# Patient Record
Sex: MALE | Race: WHITE | URBAN - METROPOLITAN AREA
[De-identification: names, ages, dates, MRNs, and addresses within clinical notes are randomized per-mention and may not be internally consistent; named-entity substitution may affect disease eponyms.]

---

## 2018-02-08 ENCOUNTER — OFFICE VISIT (OUTPATIENT)
Dept: AUDIOLOGY | Facility: CLINIC | Age: 68
End: 2018-02-08
Payer: COMMERCIAL

## 2018-02-08 ENCOUNTER — OFFICE VISIT (OUTPATIENT)
Dept: OTOLARYNGOLOGY | Facility: CLINIC | Age: 68
End: 2018-02-08
Payer: COMMERCIAL

## 2018-02-08 VITALS — HEIGHT: 65 IN | WEIGHT: 220 LBS | BODY MASS INDEX: 36.65 KG/M2 | TEMPERATURE: 98.6 F

## 2018-02-08 DIAGNOSIS — H90.3 SENSORINEURAL HEARING LOSS, BILATERAL: Primary | ICD-10-CM

## 2018-02-08 DIAGNOSIS — H90.3 BILATERAL SENSORINEURAL HEARING LOSS: Primary | ICD-10-CM

## 2018-02-08 PROCEDURE — 99207 ZZC NO CHARGE LOS: CPT | Performed by: AUDIOLOGIST

## 2018-02-08 PROCEDURE — 92550 TYMPANOMETRY & REFLEX THRESH: CPT | Performed by: AUDIOLOGIST

## 2018-02-08 PROCEDURE — 99203 OFFICE O/P NEW LOW 30 MIN: CPT | Performed by: OTOLARYNGOLOGY

## 2018-02-08 PROCEDURE — 92557 COMPREHENSIVE HEARING TEST: CPT | Performed by: AUDIOLOGIST

## 2018-02-08 NOTE — MR AVS SNAPSHOT
After Visit Summary   2/8/2018    Kendra Liang    MRN: [id number]           Patient Information     Date Of Birth          1950        Visit Information        Provider Department      2/8/2018 10:15 AM Carlos Craig MD; [place] [employer] [facility]        Today's Diagnoses     Bilateral sensorineural hearing loss    -  1      Care Instructions    General Scheduling Information  To schedule your CT/MRI scan, please contact [facility] at [phone] OR [facility] at [phone]    To schedule your Surgery, please contact our Specialty Schedulers at [phone]      ENT Clinic Locations Clinic Hours Telephone Number     [facility]  [address] [facility] [address] [facility] [place] [address]     2nd & 4th Thursday:           8:00am - 12:00pm   To schedule/reschedule an appointment with   Dr. Craig,   please contact our   Specialty Scheduling Department at:     [phone]       [facility]  [address] [place] [address] [place] [address]   Monday:             8:00am -- 4:30 pm      1st, 3rd & 5th Thursday:           8:00am - 12:00pm      [address] [address]   Wednesday:       9:00 -- 4:30 pm                    Follow-ups after your visit        Your next 10 appointments already scheduled     Feb 28, 2018  9:00 AM CST   Hearing Aid Consult with Sánchez Campos   [facility] ([facility])    [address] [place] [address] [id number] [phone]              Who to contact     If you have questions or need follow up information about today's clinic visit or your schedule please contact [facility] directly at [phone].  Normal or non-critical lab and imaging results will be communicated to you by MyChart, letter or phone within 4 business days after the clinic has received the results. If you do not hear from us within 7 days, please contact the clinic through [employer]  "or phone. If you have a critical or abnormal lab result, we will notify you by phone as soon as possible.  Submit refill requests through [employer] or call your pharmacy and they will forward the refill request to us. Please allow 3 business days for your refill to be completed.          Additional Information About Your Visit        [facility] Information     [employer] lets you send messages to your doctor, view your test results, renew your prescriptions, schedule appointments and more. To sign up, go to [email or web address] . Click on \"Log in\" on the left side of the screen, which will take you to the Welcome page. Then click on \"Sign up Now\" on the right side of the page.     You will be asked to enter the access code listed below, as well as some personal information. Please follow the directions to create your username and password.     Your access code is: [email or web address]  Expires: 2018 11:23 AM     Your access code will  in 90 days. If you need help or a new code, please call your [facility] clinic or [phone].        Care EveryWhere ID     This is your Care EveryWhere ID. This could be used by other organizations to access your [facility] medical records  [id number]        Your Vitals Were     Temperature Height BMI (Body Mass Index)             98.6  F (37  C) (Tympanic) 1.651 m (5' 5\") 36.61 kg/m2          Blood Pressure from Last 3 Encounters:   No data found for BP    Weight from Last 3 Encounters:   18 99.8 kg (220 lb)              Today, you had the following     No orders found for display       Primary Care Provider Office Phone # Fax #    Mayra Che [phone]       [facility] [address] [place] [id number]        Equal Access to Services     [facility] : Hadii risa maldonadoo Hardik, waaxda luqadaha, qaybta kaalmada yani, luis eduardo zaragoza. So [facility] [place] [phone].    ATENCIÓN: Si rhett espartie esquivel a handy " disposición servicios gratuitos de asistencia lingüística. Cj siddiqui [phone].    We comply with applicable federal civil rights laws and [place] laws. We do not discriminate on the basis of race, color, national origin, age, disability, sex, sexual orientation, or gender identity.            Thank you!     Thank you for choosing [facility]  for your care. Our goal is always to provide you with excellent care. Hearing back from our patients is one way we can continue to improve our services. Please take a few minutes to complete the written survey that you may receive in the mail after your visit with us. Thank you!             Your Updated Medication List - Protect others around you: Learn how to safely use, store and throw away your medicines at www.disposemymeds.org.      Notice  As of 2/8/2018  5:22 PM    You have not been prescribed any medications.

## 2018-02-08 NOTE — LETTER
"    2/8/2018         RE: Kendra Liang  [address]  [facility] [address]        Dear Colleague,    Thank you for referring your patient, Kendra Liang, to the [facility]. Please see a copy of my visit note below.    ENT Consultation      History of Present Illness - Kendra Liang is a 67 year old male with HL. Has had gradual hearing loss. Denies tinnitus, vertigo, and FMHx or PMHx for frequent ear infections. Was exposed to loud noises regularly in the past.     This is a Beninese speaking patient.     Past Medical History - No past medical history on file.    Current Medications - No current outpatient prescriptions on file.    Allergies - Not on File    Social History -   Social History     Social History     Marital status:      Spouse name: N/A     Number of children: N/A     Years of education: N/A     Social History Main Topics     Smoking status: Never Smoker     Smokeless tobacco: Never Used     Alcohol use None     Drug use: None     Sexual activity: Not Asked     Other Topics Concern     None     Social History Narrative     None       Family History - No family history on file.    Review of Systems - As per HPI and PMHx, otherwise review of system review of the head and neck negative.    This document serves as a record of the services and decisions personally performed and made by Carlos Craig MD. It was created on his behalf by Carlos Rios, a trained medical scribe. The creation of this document is based the provider's statements to the medical scribe.  Carlos Rios February 8, 2018 10:55 AM     Physical Exam  Temp 98.6  F (37  C) (Tympanic)  Ht 1.651 m (5' 5\")  Wt 99.8 kg (220 lb)  BMI 36.61 kg/m2  BMI: Body mass index is 36.61 kg/(m^2).    General - The patient is well nourished and well developed, and appears to have good nutritional status.  Alert and oriented to person and place, answers questions and cooperates with examination appropriately.    SKIN - " No suspicious lesions or rashes.  Respiration - No respiratory distress.     Head and Face - Normocephalic and atraumatic, with no gross asymmetry noted of the contour of the facial features.  The facial nerve is intact, with strong symmetric movements.    Voice and Breathing - The patient was breathing comfortably without the use of accessory muscles. There was no wheezing, stridor, or stertor.  The patients voice was clear and strong, and had appropriate pitch and quality.    Ears - Bilateral pinna and EACs with normal appearing overlying skin. Tympanic membrane intact with good mobility on pneumatic otoscopy bilaterally. Bony landmarks of the ossicular chain are normal. The tympanic membranes are normal in appearance. No retraction, perforation, or masses.  No fluid or purulence was seen in the external canal or the middle ear.      Eyes - Extraocular movements intact.  Sclera were not icteric or injected, conjunctiva were pink and moist.    Mouth - Examination of the oral cavity showed pink, healthy oral mucosa. No lesions or ulcerations noted.  The tongue was mobile and midline, and the dentition were in good condition.      Throat - The walls of the oropharynx were smooth, pink, moist, symmetric, and had no lesions or ulcerations.  The tonsillar pillars and soft palate were symmetric.  The uvula was midline on elevation.    Neck - Normal midline excursion of the laryngotracheal complex during swallowing.  Full range of motion on passive movement.  Palpation of the occipital, submental, submandibular, internal jugular chain, and supraclavicular nodes did not demonstrate any abnormal lymph nodes or masses.  The carotid pulse was palpable bilaterally.  Palpation of the thyroid was soft and smooth, with no nodules or goiter appreciated.  The trachea was mobile and midline.    Nose - External contour is symmetric, no gross deflection or scars.  Nasal mucosa is pink and moist with no abnormal mucus.  The septum was  midline and non-obstructive, turbinates of normal size and position.  No polyps, masses, or purulence noted on examination.    Neuro - Nonfocal neuro exam is normal, CN 2 through 12 intact, normal gait and muscle tone.    Performed in clinic today:  Audiologic Studies - An audiogram and tympanogram were performed today as part of the evaluation and personally reviewed. The tympanogram shows Type A curves on the right and Type A curves on the left, with normal canal volumes and middle ear pressures.  The audiogram showed mild to moderate  mid to high frequency sensorineural HL on the right and mild to moderate mid to high frequency sensorineural HL on the left.        A/P - Kendra Liang is a 67 year old male with sensorineural HL in the bilateral ear.  We discussed use of hearing aids.   Recheck hearing in 1 year.    Discussed use of hearing aides. Referral to audiology placed      Carlos Craig MD    The information in this document, created by the medical scribe for me, accurately reflects the services I personally performed and the decisions made by me. I have reviewed and approved this document for accuracy prior to leaving the patient care area.  Carlos Craig MD  10:55 AM, 02/08/18      Again, thank you for allowing me to participate in the care of your patient.        Sincerely,        Carlos Craig MD, MD

## 2018-02-08 NOTE — PROGRESS NOTES
"ENT Consultation      History of Present Illness - Kendra Liang is a 67 year old male with HL. Has had gradual hearing loss. Denies tinnitus, vertigo, and FMHx or PMHx for frequent ear infections. Was exposed to loud noises regularly in the past.     This is a Taiwanese speaking patient.     Past Medical History - No past medical history on file.    Current Medications - No current outpatient prescriptions on file.    Allergies - Not on File    Social History -   Social History     Social History     Marital status:      Spouse name: N/A     Number of children: N/A     Years of education: N/A     Social History Main Topics     Smoking status: Never Smoker     Smokeless tobacco: Never Used     Alcohol use None     Drug use: None     Sexual activity: Not Asked     Other Topics Concern     None     Social History Narrative     None       Family History - No family history on file.    Review of Systems - As per HPI and PMHx, otherwise review of system review of the head and neck negative.    This document serves as a record of the services and decisions personally performed and made by Carlos Craig MD. It was created on his behalf by Carlos Rios, a trained medical scribe. The creation of this document is based the provider's statements to the medical scribe.  Carlos Rios February 8, 2018 10:55 AM     Physical Exam  Temp 98.6  F (37  C) (Tympanic)  Ht 1.651 m (5' 5\")  Wt 99.8 kg (220 lb)  BMI 36.61 kg/m2  BMI: Body mass index is 36.61 kg/(m^2).    General - The patient is well nourished and well developed, and appears to have good nutritional status.  Alert and oriented to person and place, answers questions and cooperates with examination appropriately.    SKIN - No suspicious lesions or rashes.  Respiration - No respiratory distress.     Head and Face - Normocephalic and atraumatic, with no gross asymmetry noted of the contour of the facial features.  The facial nerve is intact, with strong " symmetric movements.    Voice and Breathing - The patient was breathing comfortably without the use of accessory muscles. There was no wheezing, stridor, or stertor.  The patients voice was clear and strong, and had appropriate pitch and quality.    Ears - Bilateral pinna and EACs with normal appearing overlying skin. Tympanic membrane intact with good mobility on pneumatic otoscopy bilaterally. Bony landmarks of the ossicular chain are normal. The tympanic membranes are normal in appearance. No retraction, perforation, or masses.  No fluid or purulence was seen in the external canal or the middle ear.      Eyes - Extraocular movements intact.  Sclera were not icteric or injected, conjunctiva were pink and moist.    Mouth - Examination of the oral cavity showed pink, healthy oral mucosa. No lesions or ulcerations noted.  The tongue was mobile and midline, and the dentition were in good condition.      Throat - The walls of the oropharynx were smooth, pink, moist, symmetric, and had no lesions or ulcerations.  The tonsillar pillars and soft palate were symmetric.  The uvula was midline on elevation.    Neck - Normal midline excursion of the laryngotracheal complex during swallowing.  Full range of motion on passive movement.  Palpation of the occipital, submental, submandibular, internal jugular chain, and supraclavicular nodes did not demonstrate any abnormal lymph nodes or masses.  The carotid pulse was palpable bilaterally.  Palpation of the thyroid was soft and smooth, with no nodules or goiter appreciated.  The trachea was mobile and midline.    Nose - External contour is symmetric, no gross deflection or scars.  Nasal mucosa is pink and moist with no abnormal mucus.  The septum was midline and non-obstructive, turbinates of normal size and position.  No polyps, masses, or purulence noted on examination.    Neuro - Nonfocal neuro exam is normal, CN 2 through 12 intact, normal gait and muscle tone.    Performed  in clinic today:  Audiologic Studies - An audiogram and tympanogram were performed today as part of the evaluation and personally reviewed. The tympanogram shows Type A curves on the right and Type A curves on the left, with normal canal volumes and middle ear pressures.  The audiogram showed mild to moderate  mid to high frequency sensorineural HL on the right and mild to moderate mid to high frequency sensorineural HL on the left.        A/P - Kendra Liang is a 67 year old male with sensorineural HL in the bilateral ear.  We discussed use of hearing aids.   Recheck hearing in 1 year.    Discussed use of hearing aides. Referral to audiology placed      Carlos Craig MD    The information in this document, created by the medical scribe for me, accurately reflects the services I personally performed and the decisions made by me. I have reviewed and approved this document for accuracy prior to leaving the patient care area.  Carlos Craig MD  10:55 AM, 02/08/18

## 2018-02-08 NOTE — PATIENT INSTRUCTIONS
General Scheduling Information  To schedule your CT/MRI scan, please contact [facility] at [phone] OR [facility] at [phone]    To schedule your Surgery, please contact our Specialty Schedulers at [phone]      ENT Clinic Locations Clinic Hours Telephone Number     [facility] [place]  [address] [facility]  [place] [address]     2nd & 4th Thursday:           8:00am - 12:00pm   To schedule/reschedule an appointment with   Dr. Craig,   please contact our   Specialty Scheduling Department at:     [phone]       [facility]  [address] [place] [address] [place] [address]   Monday:             8:00am -- 4:30 pm      1st, 3rd & 5th Thursday:           8:00am - 12:00pm      [facility] [address] [address]   Wednesday:       9:00 -- 4:30 pm

## 2018-02-08 NOTE — MR AVS SNAPSHOT
"              After Visit Summary   2/8/2018    Kendra Liang    MRN: [id number]           Patient Information     Date Of Birth          1950        Visit Information        Provider Department      2/8/2018 9:45 AM Marianne Cotter AuD; [place] [employer] [facility]        Today's Diagnoses     Sensorineural hearing loss, bilateral    -  1       Follow-ups after your visit        Your next 10 appointments already scheduled     Feb 28, 2018  9:00 AM CST   Hearing Aid Consult with Sánchez Campos   [facility]    [address] [place] [id number] [phone]              Who to contact     If you have questions or need follow up information about today's clinic visit or your schedule please contact [facility] directly at [phone].  Normal or non-critical lab and imaging results will be communicated to you by MyChart, letter or phone within 4 business days after the clinic has received the results. If you do not hear from us within 7 days, please contact the clinic through MyChart or phone. If you have a critical or abnormal lab result, we will notify you by phone as soon as possible.  Submit refill requests through [employer] or call your pharmacy and they will forward the refill request to us. Please allow 3 business days for your refill to be completed.          Additional Information About Your Visit        MyChart Information     [employer] lets you send messages to your doctor, view your test results, renew your prescriptions, schedule appointments and more. To sign up, go to [email or web address] . Click on \"Log in\" on the left side of the screen, which will take you to the Welcome page. Then click on \"Sign up Now\" on the right side of the page.     You will be asked to enter the access code listed below, as well as some personal information. Please follow the directions to create your username " and password.     Your access code is: [email or web address]  Expires: 2018 11:23 AM     Your access code will  in 90 days. If you need help or a new code, please call your [facility] clinic or [phone].        Care EveryWhere ID     This is your Care EveryWhere ID. This could be used by other organizations to access your [facility] medical records  [id number]         Blood Pressure from Last 3 Encounters:   No data found for BP    Weight from Last 3 Encounters:   18 220 lb (99.8 kg)              We Performed the Following     AUDIOGRAM/TYMPANOGRAM - INTERFACE     COMPREHENSIVE HEARING TEST     TYMPANOMETRY AND REFLEX THRESHOLD MEASUREMENTS        Primary Care Provider Office Phone # Fax #    Mayra Che [phone]       [facility] [address] [facility] [address]        Equal Access to Services     [facility] : Hadii aad ku hadasho Soomaali, waaxda luqadaha, qaybta kaalmada adeegyada, waxay idiin hayaan ademandi kharadav zaragoza . So [facility] [phone].    ATENCIÓN: Si habla español, tiene a handy disposición servicios gratuitos de asistencia lingüística. Llame al [phone].    We comply with applicable federal civil rights laws and [place] laws. We do not discriminate on the basis of race, color, national origin, age, disability, sex, sexual orientation, or gender identity.            Thank you!     Thank you for choosing [facility]  for your care. Our goal is always to provide you with excellent care. Hearing back from our patients is one way we can continue to improve our services. Please take a few minutes to complete the written survey that you may receive in the mail after your visit with us. Thank you!             Your Updated Medication List - Protect others around you: Learn how to safely use, store and throw away your medicines at www.disposemymeds.org.      Notice  As of 2018 11:23 AM    You have not been prescribed any medications.

## 2018-02-08 NOTE — PROGRESS NOTES
AUDIOLOGY REPORT     SUMMARY: Audiology visit completed. See audiogram for results.     RECOMMENDATIONS: Follow-up with ENT    Aishwarya Booth Licensed Audiologist #[id number]

## 2018-02-28 ENCOUNTER — OFFICE VISIT (OUTPATIENT)
Dept: AUDIOLOGY | Facility: CLINIC | Age: 68
End: 2018-02-28
Payer: COMMERCIAL

## 2018-02-28 DIAGNOSIS — H90.3 SENSORINEURAL HEARING LOSS, BILATERAL: Primary | ICD-10-CM

## 2018-02-28 PROCEDURE — 99207 ZZC NO CHARGE LOS: CPT | Performed by: AUDIOLOGIST

## 2018-02-28 PROCEDURE — V5275 EAR IMPRESSION: HCPCS | Mod: RT | Performed by: AUDIOLOGIST

## 2018-02-28 PROCEDURE — 92591 HC HEARING AID EXAM BINAURAL: CPT | Performed by: AUDIOLOGIST

## 2018-02-28 NOTE — PROGRESS NOTES
AUDIOLOGY REPORT    SUBJECTIVE: Kendra Liang is a 67 year old male was seen in the Audiology Clinic at  [facility] on 2/28/18 to discuss concerns with hearing and functional communication difficulties. The patient was accompanied by their . Kendra has been seen previously on 2/8/2018, and results revealed a bilateral high frequency sensorineural hearing loss.  The patient was medically evaluated and determined to be cleared for binaural hearing aids by Dr. Craig. Kendra notes difficulty with communication in a variety of listening situations.    OBJECTIVE:  Patient is a hearing aid candidate. Patient would like to move forward with a hearing aid evaluation today. Therefore, the patient was presented with different options for amplification to help aid in communication. Discussed styles, levels of technology and monaural vs. binaural fitting.     The hearing aid(s) mutually chosen were:  Binaural: Phonak Audeo   COLOR: P6 Silver Grey  BATTERY SIZE: 312  EARMOLD/TIPS: micro molds with canal locks  CANAL/ LENGTH: 2 xS receivers    Otoscopy revealed ears are clear of cerumen bilaterally. Bilateral earmolds were taken without incident.    ASSESSMENT:   Sensorineural hearing loss      Reviewed purchase information and warranty information with patient. The 45 day trial period was explained to patient. The patient was given a copy of the [place] Department of Health consumer brochure on purchasing hearing instruments. Patient risk factors have been provided to the patient in writing prior to the sale of the hearing aid per FDA regulation. The risk factors are also available in the User Instructional Booklet to be presented on the day of the hearing aid fitting. Hearing aid(s) ordered. Hearing aid evaluation completed.    PLAN: Kendra is scheduled to return in 2-3 weeks for a hearing aid fitting and programming. Purchase agreement will be completed on that date. Please contact this  clinic with any questions or concerns.      Jus Mckeon [facility]  Licensed Audiologist #[id number]  2/28/2018

## 2018-02-28 NOTE — MR AVS SNAPSHOT
"              After Visit Summary   2/28/2018    Kendra Liang    MRN: [id number]           Patient Information     Date Of Birth          1950        Visit Information        Provider Department      2/28/2018 8:45 AM Jus Juarez AuD; [employer] [facility]        Today's Diagnoses     Sensorineural hearing loss, bilateral    -  1       Follow-ups after your visit        Your next 10 appointments already scheduled     Mar 23, 2018  9:00 AM CDT   Hearing Aid Fitting with Sánchez Campos   [facility] ([facility]    [address] [place] [address]   [phone]              Who to contact     If you have questions or need follow up information about today's clinic visit or your schedule please contact [facility] directly at [phone].  Normal or non-critical lab and imaging results will be communicated to you by MyChart, letter or phone within 4 business days after the clinic has received the results. If you do not hear from us within 7 days, please contact the clinic through MyChart or phone. If you have a critical or abnormal lab result, we will notify you by phone as soon as possible.  Submit refill requests through [employer] or call your pharmacy and they will forward the refill request to us. Please allow 3 business days for your refill to be completed.          Additional Information About Your Visit        MyChart Information     [employer] lets you send messages to your doctor, view your test results, renew your prescriptions, schedule appointments and more. To sign up, go to [email or web address] . Click on \"Log in\" on the left side of the screen, which will take you to the Welcome page. Then click on \"Sign up Now\" on the right side of the page.     You will be asked to enter the access code listed below, as well as some personal information. Please follow the directions to create your username " and password.     Your access code is: [email or web address]  Expires: 2018 11:23 AM     Your access code will  in 90 days. If you need help or a new code, please call your [place] clinic or [phone].        Care EveryWhere ID     This is your Care EveryWhere ID. This could be used by other organizations to access your [place] medical records  [id number]         Blood Pressure from Last 3 Encounters:   No data found for BP    Weight from Last 3 Encounters:   18 220 lb (99.8 kg)              We Performed the Following     EAR IMPRESSION, EACH     HEARING AID EXAM BINAURAL        Primary Care Provider Office Phone # Fax #    Mayra Che [phone]       [facility] [address] [address]        Equal Access to Services     AKANKSHA BRITTON : Hadii aad ku hadasho Soomaali, waaxda luqadaha, qaybta kaalmada adeegyada, waxay arnoldoin hayaan ademandi zaragoza . So [facility] [phone].    ATENCIÓN: Si habla español, tiene a handy disposición servicios gratuitos de asistencia lingüística. Llame al [phone].    We comply with applicable federal civil rights laws and [place] laws. We do not discriminate on the basis of race, color, national origin, age, disability, sex, sexual orientation, or gender identity.            Thank you!     Thank you for choosing [facility]  for your care. Our goal is always to provide you with excellent care. Hearing back from our patients is one way we can continue to improve our services. Please take a few minutes to complete the written survey that you may receive in the mail after your visit with us. Thank you!             Your Updated Medication List - Protect others around you: Learn how to safely use, store and throw away your medicines at www.disposemymeds.org.      Notice  As of 2018  9:34 AM    You have not been prescribed any medications.

## 2018-03-23 ENCOUNTER — OFFICE VISIT (OUTPATIENT)
Dept: AUDIOLOGY | Facility: CLINIC | Age: 68
End: 2018-03-23
Payer: COMMERCIAL

## 2018-03-23 DIAGNOSIS — H90.3 SENSORINEURAL HEARING LOSS, BILATERAL: Primary | ICD-10-CM

## 2018-03-23 PROCEDURE — V5011 HEARING AID FITTING/CHECKING: HCPCS | Mod: RT | Performed by: AUDIOLOGIST

## 2018-03-23 PROCEDURE — V5020 CONFORMITY EVALUATION: HCPCS | Mod: LT | Performed by: AUDIOLOGIST

## 2018-03-23 PROCEDURE — V5011 HEARING AID FITTING/CHECKING: HCPCS | Mod: LT | Performed by: AUDIOLOGIST

## 2018-03-23 PROCEDURE — 92593 HC HEARING AID CHECK, BINAURAL: CPT | Performed by: AUDIOLOGIST

## 2018-03-23 PROCEDURE — V5261 HEARING AID, DIGIT, BIN, BTE: HCPCS | Mod: NU | Performed by: AUDIOLOGIST

## 2018-03-23 PROCEDURE — V5020 CONFORMITY EVALUATION: HCPCS | Mod: RT | Performed by: AUDIOLOGIST

## 2018-03-23 PROCEDURE — V5160 DISPENSING FEE BINAURAL: HCPCS | Performed by: AUDIOLOGIST

## 2018-03-23 PROCEDURE — V5264 EAR MOLD/INSERT: HCPCS | Performed by: AUDIOLOGIST

## 2018-03-23 NOTE — MR AVS SNAPSHOT
"              After Visit Summary   3/23/2018    Kendra Liang    MRN: [id number]           Patient Information     Date Of Birth          1950        Visit Information        Provider Department      3/23/2018 8:45 AM Jus Juarez AuD; [employer] [facility]        Today's Diagnoses     Sensorineural hearing loss, bilateral    -  1       Follow-ups after your visit        Your next 10 appointments already scheduled     Apr 10, 2018  3:30 PM CDT   Return Visit with Sánchez Campos   [facility]    [address] [place] [address]   [phone]              Who to contact     If you have questions or need follow up information about today's clinic visit or your schedule please contact [facility] directly at [phone].  Normal or non-critical lab and imaging results will be communicated to you by MyChart, letter or phone within 4 business days after the clinic has received the results. If you do not hear from us within 7 days, please contact the clinic through MyChart or phone. If you have a critical or abnormal lab result, we will notify you by phone as soon as possible.  Submit refill requests through [employer] or call your pharmacy and they will forward the refill request to us. Please allow 3 business days for your refill to be completed.          Additional Information About Your Visit        MyChart Information     [employer] lets you send messages to your doctor, view your test results, renew your prescriptions, schedule appointments and more. To sign up, go to [email or web address] . Click on \"Log in\" on the left side of the screen, which will take you to the Welcome page. Then click on \"Sign up Now\" on the right side of the page.     You will be asked to enter the access code listed below, as well as some personal information. Please follow the directions to create your username and " password.     Your access code is: [email or web address]  Expires: 2018 12:23 PM     Your access code will  in 90 days. If you need help or a new code, please call your [place] clinic or [phone].        Care EveryWhere ID     This is your Care EveryWhere ID. This could be used by other organizations to access your [place] medical records  [id number]         Blood Pressure from Last 3 Encounters:   No data found for BP    Weight from Last 3 Encounters:   18 220 lb (99.8 kg)              We Performed the Following     DISPENSING FEE, BINAURAL HEARING AID     EAR MOLD/INSERT, NONDISPOSABLE, ANY TYPE     HEARING AID BTE DIGITAL, BINAURAL     HEARING AID CHECK, BINAURAL     HEARING AID CONFORMITY EVALUATION     HEARING AID FIT/ORIENTATION/CHECK        Primary Care Provider Office Phone # Fax #    Mayra Che [phone]       [facility] [address] [place] [address]        Equal Access to Services     MARIA GUADALUPE [facility] : Hadii aad ku hadasho Soomaali, waaxda luqadaha, qaybta kaalmada adeegyada, waxay idiin hayaan javiereg kharadav ladarian . So [facility] [phone].    ATENCIÓN: Si habla español, tiene a handy disposición servicios gratuitos de asistencia lingüística. [facility] [phone].    We comply with applicable federal civil rights laws and [place] laws. We do not discriminate on the basis of race, color, national origin, age, disability, sex, sexual orientation, or gender identity.            Thank you!     Thank you for choosing [facility]  for your care. Our goal is always to provide you with excellent care. Hearing back from our patients is one way we can continue to improve our services. Please take a few minutes to complete the written survey that you may receive in the mail after your visit with us. Thank you!             Your Updated Medication List - Protect others around you: Learn how to safely use, store and throw away your medicines at  www.disposemymeds.org.      Notice  As of 3/23/2018  9:46 AM    You have not been prescribed any medications.

## 2018-03-23 NOTE — PROGRESS NOTES
AUDIOLOGY REPORT    SUBJECTIVE: Kendra Liang is a 67 year old male who was seen in the Audiology Clinic at the [facility] for a fitting of binaural hearing aids. Previous results have revealed a bilateral sensorineural hearing loss. The patient was given medical clearance to pursue amplification by  Carlos Craig MD. Patient was accompanied today's appointment by his .     OBJECTIVE: Prior to fitting, a hearing aid check was performed to ensure device functionality.The hearing aid conformity evaluation was completed.The hearing aids were placed and they provided a good fit. Real-ear-probe-microphone measurements were completed on the [employer] system and were an acceptable match to NAL-NL2 target with soft sounds audible, moderate sounds comfortable, and loud sounds below discomfort. UCLs are verified through maximum power output measures and demonstrate appropriate limiting of loud inputs. Kendra was oriented to proper hearing aid use, care, cleaning (no water, dry brush), batteries (size 312, insertion/removal, toxicity, low-battery signal), aid insertion/removal, user booklet, warranty information, storage cases, and other hearing aid details. The patient confirmed understanding of hearing aid use and care, and showed proper insertion of hearing aid and batteries while in the office today.Kendra reported good volume and sound quality today.   Hearing aids were programmed as follows:  Program 1: All Around    ASSESSMENT: Binaural hearing aid(s) were fit today. Verification measures were performed.   EAR(S) FIT: Binaural  MA HEARING AID MODEL NAME:  Mckinley Gonsalez [id number]  MA HEARING AID MODEL NUMBER: [id number]  HEARING AID STYLE: RITE  EARMOLDS/TIP/ LINK: Micromolds with canal locks and size 2 xS receivers  SERIAL NUMBERS: Right: [id number]; Left: [id number]  WARRANTY END DATE: 6/5/2021  Kendra signed the Hearing Aid Purchase Agreement and was given a copy, as well as details on his  hearing aids.    PLAN:Kendra will return for follow-up in 2-3 weeks for a hearing aid review appointment. Please call this clinic with questions regarding today s appointment.    CHARGES:    Hearing Aid Check: Binaural, [address]24, $81.00  Dispensing Fee: Binaural, , $500.00  Fit/Orientation: Binaural, , $322.00  Hearing Aid Conformity Evaluation: , Qty:2 $174  Hearing Aid Digital: Binaural, BTE,  $2523  Earmolds: Binaural  $160  Total: $3760 Bill to patient's insurance, balance to patient.     Jus Mckeon [facility]  Licensed Audiologist #[id number]  3/23/2018

## 2018-04-10 ENCOUNTER — OFFICE VISIT (OUTPATIENT)
Dept: AUDIOLOGY | Facility: CLINIC | Age: 68
End: 2018-04-10
Payer: COMMERCIAL

## 2018-04-10 DIAGNOSIS — H90.3 SENSORINEURAL HEARING LOSS, BILATERAL: Primary | ICD-10-CM

## 2018-04-10 PROCEDURE — 99207 ZZC NO CHARGE LOS: CPT | Performed by: AUDIOLOGIST

## 2018-04-10 PROCEDURE — V5266 BATTERY FOR HEARING DEVICE: HCPCS | Performed by: AUDIOLOGIST

## 2018-04-10 PROCEDURE — V5299 HEARING SERVICE: HCPCS | Performed by: AUDIOLOGIST

## 2018-04-10 NOTE — MR AVS SNAPSHOT
"              After Visit Summary   4/10/2018    Kendra Liang    MRN: [id number]           Patient Information     Date Of Birth          1950        Visit Information        Provider Department      4/10/2018 3:15 PM Jus Juarez, AuD; [employer] [facility]        Today's Diagnoses     Sensorineural hearing loss, bilateral    -  1       Follow-ups after your visit        Who to contact     If you have questions or need follow up information about today's clinic visit or your schedule please contact [facility] directly at [phone].  Normal or non-critical lab and imaging results will be communicated to you by [employer], letter or phone within 4 business days after the clinic has received the results. If you do not hear from us within 7 days, please contact the clinic through [employer] or phone. If you have a critical or abnormal lab result, we will notify you by phone as soon as possible.  Submit refill requests through [employer] or call your pharmacy and they will forward the refill request to us. Please allow 3 business days for your refill to be completed.          Additional Information About Your Visit        MyChart Information     [employer] lets you send messages to your doctor, view your test results, renew your prescriptions, schedule appointments and more. To sign up, go to [email or web address] . Click on \"Log in\" on the left side of the screen, which will take you to the Welcome page. Then click on \"Sign up Now\" on the right side of the page.     You will be asked to enter the access code listed below, as well as some personal information. Please follow the directions to create your username and password.     Your access code is: [email or web address]  Expires: 2018 12:23 PM     Your access code will  in 90 days. If you need help or a new code, please call your [facility] or [phone].        Care EveryWhere ID     This is your Care " EveryWhere ID. This could be used by other organizations to access your [place] medical records  [id number]         Blood Pressure from Last 3 Encounters:   No data found for BP    Weight from Last 3 Encounters:   02/08/18 220 lb (99.8 kg)              We Performed the Following     HC HEARING DEVICE BATTERY     HEARING AID CHECK/NO CHARGE        Primary Care Provider Office Phone # Fax #    Mayra Che [phone]       [facility] [address] [place] [address]        Equal Access to Services     [facility]: Hadii aad ku hadasho Soomaali, waaxda luqadaha, qaybta kaalmada adeegyada, waxay idiin hayaan jose zaragoza . So [facility] [phone].    ATENCIÓN: Si habla español, tiene a handy disposición servicios gratuitos de asistencia lingüística. [facility] [phone].    We comply with applicable federal civil rights laws and [place] laws. We do not discriminate on the basis of race, color, national origin, age, disability, sex, sexual orientation, or gender identity.            Thank you!     Thank you for choosing [facility]  for your care. Our goal is always to provide you with excellent care. Hearing back from our patients is one way we can continue to improve our services. Please take a few minutes to complete the written survey that you may receive in the mail after your visit with us. Thank you!             Your Updated Medication List - Protect others around you: Learn how to safely use, store and throw away your medicines at www.disposemymeds.org.      Notice  As of 4/10/2018  3:49 PM    You have not been prescribed any medications.

## 2018-04-10 NOTE — PROGRESS NOTES
AUDIOLOGY REPORT    SUBJECTIVE:Kendra Liang is a 67 year old male who was seen in the Audiology Clinic at the [facility] on 4/10/2018  for a follow-up check regarding the fitting of new hearing aids. Previous results have revealed binaural sensorineural hearing loss.  The patient has been seen previously in this clinic and was fit with Phonak RITEs on 3/23/2018.  Kendra reports good sound quality with the hearing aid(s) and increased wear time with the heaing aids. Patient was accompanied to today's appointment by his .     OBJECTIVE:     Based on patient report, the following changes were made; None.    Reviewed 45 day trial period, care, cleaning (no water, dry brush), batteries (size 312) insertion/removal, toxicity, low-battery signal), aid insertion/removal, volume adjustment (if applicable), user booklet, warranty information, storage cases, and other hearing aid details.     Per guidelines of patient's insurance, 24 units of size 312 batteries were dispensed today. Reviewed that patent is eligable for batteries once every 90 days, and how they can request new batteries.     ASSESSMENT: A follow-up appointment for hearing aid fitting was completed today. Changes to hearing aid was completed as outlined above.     PLAN:Kendra will return for follow-up as needed, or at least every 9-12 months for cleaning and assessment of hearing aid.  . Please call this clinic with any questions regarding today s appointment.    CHARGES:   .002 x24 Batteries $24 bill to patient's insurance.     Jus Mckeon [facility]  Licensed Audiologist #[id number]  4/10/2018

## 2018-07-19 ENCOUNTER — TELEPHONE (OUTPATIENT)
Dept: AUDIOLOGY | Facility: CLINIC | Age: 68
End: 2018-07-19
Payer: COMMERCIAL

## 2018-07-19 DIAGNOSIS — H90.3 SENSORINEURAL HEARING LOSS, BILATERAL: Primary | ICD-10-CM

## 2018-07-19 PROCEDURE — V5266 BATTERY FOR HEARING DEVICE: HCPCS | Performed by: AUDIOLOGIST

## 2018-07-19 NOTE — TELEPHONE ENCOUNTER
Reason for Call:  Other call back    Detailed comments: Patient would like new hearing aid batteries delivered. Home address is confirmed.     Phone Number Patient can be reached at: Cell number on file:    Telephone Information:   Mobile [phone]       Best Time: asap    Can we leave a detailed message on this number? YES    Call taken on 7/19/2018 at 11:08 AM by Umberto Lyon

## 2018-07-19 NOTE — TELEPHONE ENCOUNTER
Patient calls to request his 3 month supply of batteries be mailed to his home address. Mailed 24 size 312 batteries to the address on file.     CHARGES:   .002 x24 Batteries ($1). Total: $24. Bill to patient's insurance.    Aishwarya Pierce, CCC-A  Licensed Audiologist #[id number]  7/19/2018

## 2018-11-13 ENCOUNTER — ALLIED HEALTH/NURSE VISIT (OUTPATIENT)
Dept: AUDIOLOGY | Facility: CLINIC | Age: 68
End: 2018-11-13
Payer: COMMERCIAL

## 2018-11-13 DIAGNOSIS — H90.3 SENSORINEURAL HEARING LOSS, BILATERAL: Primary | ICD-10-CM

## 2018-11-13 PROCEDURE — V5266 BATTERY FOR HEARING DEVICE: HCPCS | Performed by: AUDIOLOGIST

## 2018-11-13 PROCEDURE — 99207 ZZC NO CHARGE LOS: CPT | Performed by: AUDIOLOGIST

## 2018-11-13 NOTE — MR AVS SNAPSHOT
"              After Visit Summary   2018    Kendra Liang    MRN: [id number]           Patient Information     Date Of Birth          1950        Visit Information        Provider Department      2018 12:30 PM [facility]        Today's Diagnoses     Sensorineural hearing loss, bilateral    -  1       Follow-ups after your visit        Who to contact     If you have questions or need follow up information about today's clinic visit or your schedule please contact [facility] directly at [phone].  Normal or non-critical lab and imaging results will be communicated to you by [employer], letter or phone within 4 business days after the clinic has received the results. If you do not hear from us within 7 days, please contact the clinic through [employer] or phone. If you have a critical or abnormal lab result, we will notify you by phone as soon as possible.  Submit refill requests through [employer] or call your pharmacy and they will forward the refill request to us. Please allow 3 business days for your refill to be completed.          Additional Information About Your Visit        MyChart Information     [employer] lets you send messages to your doctor, view your test results, renew your prescriptions, schedule appointments and more. To sign up, go to [email or web address] . Click on \"Log in\" on the left side of the screen, which will take you to the Welcome page. Then click on \"Sign up Now\" on the right side of the page.     You will be asked to enter the access code listed below, as well as some personal information. Please follow the directions to create your username and password.     Your access code is: [email or web address]  Expires: 2019  1:29 PM     Your access code will  in 90 days. If you need help or a new code, please call your [facility] or [phone].        Care EveryWhere ID     This is your Care EveryWhere ID. This could be used " by other organizations to access your [place] medical records  [id number]         Blood Pressure from Last 3 Encounters:   No data found for BP    Weight from Last 3 Encounters:   02/08/18 220 lb (99.8 kg)              We Performed the Following     HC HEARING DEVICE BATTERY        Primary Care Provider Office Phone # Fax #    Mayra Che [phone]       [facility] [address] [facility] [address]        Equal Access to Services     AKANKSHA BRITTON : Hadii aad ku hadasho Soomaali, waaxda luqadaha, qaybta kaalmada adeegyada, waxay idiin hayaan adeeg kharash la'aan . So [facility] [phone].    ATENCIÓN: Si habla español, tiene a handy disposición servicios gratuitos de asistencia lingüística. [facility] [phone].    We comply with applicable federal civil rights laws and [place] laws. We do not discriminate on the basis of race, color, national origin, age, disability, sex, sexual orientation, or gender identity.            Thank you!     Thank you for choosing [facility]  for your care. Our goal is always to provide you with excellent care. Hearing back from our patients is one way we can continue to improve our services. Please take a few minutes to complete the written survey that you may receive in the mail after your visit with us. Thank you!             Your Updated Medication List - Protect others around you: Learn how to safely use, store and throw away your medicines at www.disposemymeds.org.      Notice  As of 11/13/2018  1:29 PM    You have not been prescribed any medications.

## 2018-11-13 NOTE — PROGRESS NOTES
Patient walks in to request his 3 month supply of batteries. He was provided 24 size 312 batteries as well as 2 packs of waxguards for his Phonak RITE hearing aids.     CHARGES:   .002 x24 Batteries ($1). Total: $24 Bill to patient's insurance.    Jus Mckeon CCC-A  Licensed Audiologist #[id number]  11/13/2018

## 2019-05-17 ENCOUNTER — TELEPHONE (OUTPATIENT)
Dept: AUDIOLOGY | Facility: CLINIC | Age: 69
End: 2019-05-17
Payer: COMMERCIAL

## 2019-05-17 DIAGNOSIS — H90.3 SENSORINEURAL HEARING LOSS, BILATERAL: Primary | ICD-10-CM

## 2019-05-17 PROCEDURE — V5266 BATTERY FOR HEARING DEVICE: HCPCS | Mod: NU | Performed by: AUDIOLOGIST

## 2019-05-17 NOTE — TELEPHONE ENCOUNTER
Patient walks in to request his 3 month supply of batteries. He was provided 24 size 312 batteries as well as 2 packs of waxguards for his Phonak RITE hearing aids.      CHARGES:              .002 x24 Batteries ($1). Total: $24 Bill to patient's insurance.    Jus Mckeon CCC-A  Licensed Audiologist #[id number]  5/17/2019

## 2020-12-09 ENCOUNTER — TELEPHONE (OUTPATIENT)
Dept: AUDIOLOGY | Facility: CLINIC | Age: 70
End: 2020-12-09
Payer: COMMERCIAL

## 2020-12-09 DIAGNOSIS — H90.3 SENSORINEURAL HEARING LOSS, BILATERAL: Primary | ICD-10-CM

## 2020-12-09 PROCEDURE — V5266 BATTERY FOR HEARING DEVICE: HCPCS | Mod: NU | Performed by: AUDIOLOGIST

## 2020-12-09 PROCEDURE — 99207 PR NO CHARGE LOS: CPT | Performed by: AUDIOLOGIST

## 2020-12-09 NOTE — TELEPHONE ENCOUNTER
Patient walks in and requests batteries and Phonak waxguards. Patient was provided 24 size 312 batteries, his 90 day supply.     CHARGES:   [id number] x24 batteries.     Jus Mckeon CCC-A  Licensed Audiologist #[id number]  12/9/2020

## 2020-12-14 ENCOUNTER — APPOINTMENT (OUTPATIENT)
Dept: URBAN - METROPOLITAN AREA CLINIC 259 | Age: 70
Setting detail: DERMATOLOGY
End: 2020-12-14

## 2020-12-14 DIAGNOSIS — L57.8 OTHER SKIN CHANGES DUE TO CHRONIC EXPOSURE TO NONIONIZING RADIATION: ICD-10-CM

## 2020-12-14 DIAGNOSIS — D22 MELANOCYTIC NEVI: ICD-10-CM

## 2020-12-14 DIAGNOSIS — L91.8 OTHER HYPERTROPHIC DISORDERS OF THE SKIN: ICD-10-CM

## 2020-12-14 DIAGNOSIS — B35.1 TINEA UNGUIUM: ICD-10-CM

## 2020-12-14 PROBLEM — D22.5 MELANOCYTIC NEVI OF TRUNK: Status: ACTIVE | Noted: 2020-12-14

## 2020-12-14 PROCEDURE — 99203 OFFICE O/P NEW LOW 30 MIN: CPT

## 2020-12-14 PROCEDURE — OTHER COUNSELING: OTHER

## 2020-12-14 PROCEDURE — OTHER PRESCRIPTION: OTHER

## 2020-12-14 PROCEDURE — OTHER LIQUID NITROGEN: OTHER

## 2020-12-14 RX ORDER — CICLOPIROX 80 MG/ML
SOLUTION TOPICAL
Qty: 1 | Refills: 2 | Status: ERX | COMMUNITY
Start: 2020-12-14

## 2020-12-14 ASSESSMENT — LOCATION DETAILED DESCRIPTION DERM
LOCATION DETAILED: LEFT AXILLARY VAULT
LOCATION DETAILED: INFERIOR THORACIC SPINE
LOCATION DETAILED: RIGHT SUPERIOR LATERAL NECK
LOCATION DETAILED: LEFT SUPERIOR LATERAL NECK
LOCATION DETAILED: RIGHT ANTERIOR AXILLA
LOCATION DETAILED: RIGHT CENTRAL POSTERIOR NECK
LOCATION DETAILED: LEFT CENTRAL POSTERIOR NECK
LOCATION DETAILED: LEFT MEDIAL UPPER BACK
LOCATION DETAILED: RIGHT CENTRAL LATERAL NECK
LOCATION DETAILED: LEFT CENTRAL LATERAL NECK
LOCATION DETAILED: RIGHT AXILLARY VAULT

## 2020-12-14 ASSESSMENT — LOCATION SIMPLE DESCRIPTION DERM
LOCATION SIMPLE: NECK
LOCATION SIMPLE: LEFT UPPER BACK
LOCATION SIMPLE: RIGHT ANTERIOR AXILLA
LOCATION SIMPLE: LEFT AXILLARY VAULT
LOCATION SIMPLE: RIGHT AXILLARY VAULT
LOCATION SIMPLE: UPPER BACK

## 2020-12-14 ASSESSMENT — LOCATION ZONE DERM
LOCATION ZONE: NECK
LOCATION ZONE: AXILLAE
LOCATION ZONE: TRUNK

## 2020-12-14 NOTE — PROCEDURE: LIQUID NITROGEN
Render Note In Bullet Format When Appropriate: No
Post-Care Instructions: I reviewed with the patient in detail post-care instructions. Patient is to wear sunprotection, and avoid picking at any of the treated lesions. Pt may apply Vaseline to crusted or scabbing areas.
Medical Necessity Information: It is in your best interest to select a reason for this procedure from the list below. All of these items fulfill various CMS LCD requirements except the new and changing color options.
Duration Of Freeze Thaw-Cycle (Seconds): 5
Number Of Freeze-Thaw Cycles: 1 freeze-thaw cycle
Medical Necessity Clause: This procedure was medically necessary because the lesions that were treated were:
Consent: The patient's consent was obtained including but not limited to risks of crusting, scabbing, blistering, scarring, darker or lighter pigmentary change, recurrence, incomplete removal and infection.
Detail Level: Zone

## 2021-03-15 ENCOUNTER — APPOINTMENT (OUTPATIENT)
Dept: URBAN - METROPOLITAN AREA CLINIC 259 | Age: 71
Setting detail: DERMATOLOGY
End: 2021-03-15

## 2021-03-15 DIAGNOSIS — R20.8 OTHER DISTURBANCES OF SKIN SENSATION: ICD-10-CM

## 2021-03-15 DIAGNOSIS — B35.1 TINEA UNGUIUM: ICD-10-CM

## 2021-03-15 PROCEDURE — OTHER COUNSELING: OTHER

## 2021-03-15 PROCEDURE — OTHER PRESCRIPTION: OTHER

## 2021-03-15 PROCEDURE — 99213 OFFICE O/P EST LOW 20 MIN: CPT

## 2021-03-15 RX ORDER — CICLOPIROX 80 MG/ML
SOLUTION TOPICAL
Qty: 1 | Refills: 3 | Status: ERX

## 2021-03-15 ASSESSMENT — LOCATION DETAILED DESCRIPTION DERM
LOCATION DETAILED: RIGHT GREAT TOENAIL
LOCATION DETAILED: LEFT DORSAL 2ND TOE
LOCATION DETAILED: RIGHT 2ND TOENAIL
LOCATION DETAILED: RIGHT LATERAL 3RD TOE
LOCATION DETAILED: LEFT DORSAL 3RD TOE
LOCATION DETAILED: LEFT GREAT TOENAIL

## 2021-03-15 ASSESSMENT — LOCATION SIMPLE DESCRIPTION DERM
LOCATION SIMPLE: RIGHT 3RD TOE
LOCATION SIMPLE: LEFT 3RD TOE
LOCATION SIMPLE: RIGHT 2ND TOE
LOCATION SIMPLE: LEFT GREAT TOE
LOCATION SIMPLE: RIGHT GREAT TOE
LOCATION SIMPLE: LEFT 2ND TOE

## 2021-03-15 ASSESSMENT — LOCATION ZONE DERM
LOCATION ZONE: TOE
LOCATION ZONE: TOENAIL

## 2021-09-16 ENCOUNTER — APPOINTMENT (OUTPATIENT)
Dept: URBAN - METROPOLITAN AREA CLINIC 257 | Age: 71
Setting detail: DERMATOLOGY
End: 2021-09-16

## 2021-09-16 DIAGNOSIS — B35.8 OTHER DERMATOPHYTOSES: ICD-10-CM

## 2021-09-16 DIAGNOSIS — B35.1 TINEA UNGUIUM: ICD-10-CM

## 2021-09-16 DIAGNOSIS — R20.8 OTHER DISTURBANCES OF SKIN SENSATION: ICD-10-CM

## 2021-09-16 PROCEDURE — OTHER MIPS QUALITY: OTHER

## 2021-09-16 PROCEDURE — OTHER PRESCRIPTION: OTHER

## 2021-09-16 PROCEDURE — OTHER COUNSELING: OTHER

## 2021-09-16 PROCEDURE — 99213 OFFICE O/P EST LOW 20 MIN: CPT

## 2021-09-16 RX ORDER — CICLOPIROX 80 MG/ML
SOLUTION TOPICAL
Qty: 6.6 | Refills: 4 | Status: ERX | COMMUNITY
Start: 2021-09-16

## 2021-09-16 RX ORDER — KETOCONAZOLE 20 MG/G
CREAM TOPICAL BID
Qty: 60 | Refills: 2 | Status: ERX | COMMUNITY
Start: 2021-09-16

## 2021-09-16 ASSESSMENT — LOCATION SIMPLE DESCRIPTION DERM
LOCATION SIMPLE: LEFT 3RD TOE
LOCATION SIMPLE: LEFT GREAT TOE
LOCATION SIMPLE: LEFT 2ND TOE
LOCATION SIMPLE: RIGHT GREAT TOE
LOCATION SIMPLE: RIGHT 3RD TOE
LOCATION SIMPLE: RIGHT 2ND TOE

## 2021-09-16 ASSESSMENT — LOCATION DETAILED DESCRIPTION DERM
LOCATION DETAILED: LEFT DORSAL 2ND TOE
LOCATION DETAILED: LEFT GREAT TOENAIL
LOCATION DETAILED: LEFT DORSAL 3RD TOE
LOCATION DETAILED: RIGHT LATERAL 3RD TOE
LOCATION DETAILED: RIGHT GREAT TOENAIL
LOCATION DETAILED: RIGHT 2ND TOENAIL

## 2021-09-16 ASSESSMENT — LOCATION ZONE DERM
LOCATION ZONE: TOENAIL
LOCATION ZONE: TOE